# Patient Record
Sex: MALE
[De-identification: names, ages, dates, MRNs, and addresses within clinical notes are randomized per-mention and may not be internally consistent; named-entity substitution may affect disease eponyms.]

---

## 2018-06-01 PROBLEM — K42.0 UMBILICAL HERNIA WITH OBSTRUCTION, WITHOUT GANGRENE: Status: ACTIVE | Noted: 2018-06-01

## 2018-06-05 PROCEDURE — 88302 TISSUE EXAM BY PATHOLOGIST: CPT | Performed by: SURGERY

## 2018-11-09 ENCOUNTER — IMAGING SERVICES (OUTPATIENT)
Dept: OTHER | Age: 48
End: 2018-11-09

## 2018-11-09 ENCOUNTER — HOSPITAL (OUTPATIENT)
Dept: OTHER | Age: 48
End: 2018-11-09
Attending: HOSPITALIST

## 2018-11-09 LAB
ANALYZER ANC (IANC): NORMAL
ANION GAP SERPL CALC-SCNC: 16 MMOL/L (ref 10–20)
BASOPHILS # BLD: 0 THOUSAND/MCL (ref 0–0.3)
BASOPHILS NFR BLD: 0 %
BUN SERPL-MCNC: 16 MG/DL (ref 6–20)
BUN/CREAT SERPL: 14 (ref 7–25)
CALCIUM SERPL-MCNC: 9 MG/DL (ref 8.4–10.2)
CHLORIDE: 104 MMOL/L (ref 98–107)
CO2 SERPL-SCNC: 25 MMOL/L (ref 21–32)
CREAT SERPL-MCNC: 1.11 MG/DL (ref 0.67–1.17)
DIFFERENTIAL METHOD BLD: NORMAL
EOSINOPHIL # BLD: 0.2 THOUSAND/MCL (ref 0.1–0.5)
EOSINOPHIL NFR BLD: 2 %
ERYTHROCYTE [DISTWIDTH] IN BLOOD: 13.2 % (ref 11–15)
GLUCOSE SERPL-MCNC: 119 MG/DL (ref 65–99)
HEMATOCRIT: 42.8 % (ref 39–51)
HGB BLD-MCNC: 14.3 GM/DL (ref 13–17)
LYMPHOCYTES # BLD: 1.5 THOUSAND/MCL (ref 1–4.8)
LYMPHOCYTES NFR BLD: 18 %
MAGNESIUM SERPL-MCNC: 2.4 MG/DL (ref 1.7–2.4)
MCH RBC QN AUTO: 31.4 PG (ref 26–34)
MCHC RBC AUTO-ENTMCNC: 33.4 GM/DL (ref 32–36.5)
MCV RBC AUTO: 93.9 FL (ref 78–100)
MONOCYTES # BLD: 0.9 THOUSAND/MCL (ref 0.3–0.9)
MONOCYTES NFR BLD: 10 %
NEUTROPHILS # BLD: 5.8 THOUSAND/MCL (ref 1.8–7.7)
NEUTROPHILS NFR BLD: 70 %
NEUTS SEG NFR BLD: NORMAL %
NRBC (NRBCRE): NORMAL
NT-PROBNP SERPL-MCNC: 2381 PG/ML
PLATELET # BLD: 199 THOUSAND/MCL (ref 140–450)
POTASSIUM SERPL-SCNC: 4.1 MMOL/L (ref 3.4–5.1)
PROCALCITONIN SERPL IA-MCNC: <0.05 NG/ML
RBC # BLD: 4.56 MILLION/MCL (ref 4.5–5.9)
SODIUM SERPL-SCNC: 141 MMOL/L (ref 135–145)
TROPONIN I SERPL HS-MCNC: <0.02 NG/ML
WBC # BLD: 8.4 THOUSAND/MCL (ref 4.2–11)

## 2018-11-10 ENCOUNTER — DIAGNOSTIC TRANS (OUTPATIENT)
Dept: OTHER | Age: 48
End: 2018-11-10

## 2018-11-10 LAB
ANALYZER ANC (IANC): ABNORMAL
ANION GAP SERPL CALC-SCNC: 12 MMOL/L (ref 10–20)
BASOPHILS # BLD: 0 THOUSAND/MCL (ref 0–0.3)
BASOPHILS NFR BLD: 0 %
BUN SERPL-MCNC: 15 MG/DL (ref 6–20)
BUN/CREAT SERPL: 16 (ref 7–25)
CALCIUM SERPL-MCNC: 8.4 MG/DL (ref 8.4–10.2)
CHLORIDE: 104 MMOL/L (ref 98–107)
CO2 SERPL-SCNC: 28 MMOL/L (ref 21–32)
CREAT SERPL-MCNC: 0.92 MG/DL (ref 0.67–1.17)
DIFFERENTIAL METHOD BLD: ABNORMAL
EOSINOPHIL # BLD: 0.3 THOUSAND/MCL (ref 0.1–0.5)
EOSINOPHIL NFR BLD: 3 %
ERYTHROCYTE [DISTWIDTH] IN BLOOD: 13.4 % (ref 11–15)
GLUCOSE SERPL-MCNC: 105 MG/DL (ref 65–99)
HEMATOCRIT: 41.3 % (ref 39–51)
HGB BLD-MCNC: 13.5 GM/DL (ref 13–17)
LYMPHOCYTES # BLD: 2.3 THOUSAND/MCL (ref 1–4.8)
LYMPHOCYTES NFR BLD: 25 %
MCH RBC QN AUTO: 31 PG (ref 26–34)
MCHC RBC AUTO-ENTMCNC: 32.7 GM/DL (ref 32–36.5)
MCV RBC AUTO: 94.7 FL (ref 78–100)
MONOCYTES # BLD: 1 THOUSAND/MCL (ref 0.3–0.9)
MONOCYTES NFR BLD: 12 %
NEUTROPHILS # BLD: 5.3 THOUSAND/MCL (ref 1.8–7.7)
NEUTROPHILS NFR BLD: 60 %
NEUTS SEG NFR BLD: ABNORMAL %
NRBC (NRBCRE): ABNORMAL
PLATELET # BLD: 218 THOUSAND/MCL (ref 140–450)
POTASSIUM SERPL-SCNC: 3.9 MMOL/L (ref 3.4–5.1)
RBC # BLD: 4.36 MILLION/MCL (ref 4.5–5.9)
SODIUM SERPL-SCNC: 140 MMOL/L (ref 135–145)
WBC # BLD: 8.9 THOUSAND/MCL (ref 4.2–11)

## 2018-11-26 PROBLEM — C81.90 HODGKIN'S DISEASE IN REMISSION (HCC): Status: ACTIVE | Noted: 2018-11-26

## 2018-11-28 PROBLEM — I82.4Z9 DEEP VEIN THROMBOSIS (DVT) OF DISTAL VEIN OF LOWER EXTREMITY, UNSPECIFIED CHRONICITY, UNSPECIFIED LATERALITY (HCC): Status: ACTIVE | Noted: 2018-11-28

## 2018-11-28 PROBLEM — G47.33 OSA (OBSTRUCTIVE SLEEP APNEA): Status: ACTIVE | Noted: 2018-11-28

## 2018-11-28 PROBLEM — I26.99 OTHER ACUTE PULMONARY EMBOLISM WITHOUT ACUTE COR PULMONALE (HCC): Status: ACTIVE | Noted: 2018-11-28

## 2018-11-28 PROBLEM — R06.81 WITNESSED EPISODE OF APNEA: Status: ACTIVE | Noted: 2018-11-28

## 2019-03-12 PROCEDURE — 81241 F5 GENE: CPT | Performed by: INTERNAL MEDICINE

## 2019-03-12 PROCEDURE — G0452 MOLECULAR PATHOLOGY INTERPR: HCPCS | Performed by: INTERNAL MEDICINE

## 2019-03-12 PROCEDURE — 85300 ANTITHROMBIN III ACTIVITY: CPT | Performed by: INTERNAL MEDICINE

## 2019-03-12 PROCEDURE — 85610 PROTHROMBIN TIME: CPT | Performed by: INTERNAL MEDICINE

## 2019-03-12 PROCEDURE — 85303 CLOT INHIBIT PROT C ACTIVITY: CPT | Performed by: INTERNAL MEDICINE

## 2019-03-12 PROCEDURE — 85732 THROMBOPLASTIN TIME PARTIAL: CPT | Performed by: INTERNAL MEDICINE

## 2019-03-12 PROCEDURE — 81240 F2 GENE: CPT | Performed by: INTERNAL MEDICINE

## 2019-03-12 PROCEDURE — 85306 CLOT INHIBIT PROT S FREE: CPT | Performed by: INTERNAL MEDICINE

## 2019-03-12 PROCEDURE — 85613 RUSSELL VIPER VENOM DILUTED: CPT | Performed by: INTERNAL MEDICINE

## 2019-03-12 PROCEDURE — 85305 CLOT INHIBIT PROT S TOTAL: CPT | Performed by: INTERNAL MEDICINE

## 2019-03-12 PROCEDURE — 85705 THROMBOPLASTIN INHIBITION: CPT | Performed by: INTERNAL MEDICINE

## 2019-07-03 PROCEDURE — 85613 RUSSELL VIPER VENOM DILUTED: CPT | Performed by: INTERNAL MEDICINE

## 2019-07-03 PROCEDURE — 85610 PROTHROMBIN TIME: CPT | Performed by: INTERNAL MEDICINE

## 2019-07-03 PROCEDURE — 85705 THROMBOPLASTIN INHIBITION: CPT | Performed by: INTERNAL MEDICINE

## 2019-07-03 PROCEDURE — 85732 THROMBOPLASTIN TIME PARTIAL: CPT | Performed by: INTERNAL MEDICINE

## 2019-07-03 PROCEDURE — 36415 COLL VENOUS BLD VENIPUNCTURE: CPT | Performed by: INTERNAL MEDICINE

## 2019-08-26 PROBLEM — D68.62 LUPUS ANTICOAGULANT DISORDER (HCC): Status: ACTIVE | Noted: 2019-08-26

## 2020-02-27 PROBLEM — I82.4Z9 DEEP VEIN THROMBOSIS (DVT) OF DISTAL VEIN OF LOWER EXTREMITY, UNSPECIFIED CHRONICITY, UNSPECIFIED LATERALITY (HCC): Status: RESOLVED | Noted: 2018-11-28 | Resolved: 2020-02-27

## 2020-02-27 PROBLEM — K42.0 UMBILICAL HERNIA WITH OBSTRUCTION, WITHOUT GANGRENE: Status: RESOLVED | Noted: 2018-06-01 | Resolved: 2020-02-27

## 2020-02-27 PROBLEM — I26.99 OTHER ACUTE PULMONARY EMBOLISM WITHOUT ACUTE COR PULMONALE (HCC): Status: RESOLVED | Noted: 2018-11-28 | Resolved: 2020-02-27

## 2020-02-28 PROBLEM — K45.8 OTHER SPECIFIED ABDOMINAL HERNIA WITHOUT OBSTRUCTION OR GANGRENE: Status: ACTIVE | Noted: 2020-02-28

## 2020-02-28 PROBLEM — B35.6 TINEA CRURIS: Status: ACTIVE | Noted: 2020-02-28

## 2020-12-28 PROCEDURE — 88302 TISSUE EXAM BY PATHOLOGIST: CPT | Performed by: SURGERY

## 2021-04-11 DIAGNOSIS — Z23 NEED FOR VACCINATION: ICD-10-CM

## 2022-11-25 ENCOUNTER — HOSPITAL ENCOUNTER (OUTPATIENT)
Facility: HOSPITAL | Age: 52
Discharge: HOME | End: 2022-11-26
Attending: ORTHOPAEDIC SURGERY | Admitting: ORTHOPAEDIC SURGERY
Payer: COMMERCIAL

## 2022-11-25 ENCOUNTER — ANESTHESIA EVENT (OUTPATIENT)
Dept: SURGERY | Facility: HOSPITAL | Age: 52
End: 2022-11-25
Payer: COMMERCIAL

## 2022-11-25 ENCOUNTER — ANESTHESIA (OUTPATIENT)
Dept: SURGERY | Facility: HOSPITAL | Age: 52
End: 2022-11-25
Payer: COMMERCIAL

## 2022-11-25 PROBLEM — R09.02 OXYGEN DECREASE: Status: ACTIVE | Noted: 2022-11-25

## 2022-11-25 LAB
APTT PPP: 27.1 SECONDS (ref 23.3–35.6)
ERYTHROCYTE [DISTWIDTH] IN BLOOD BY AUTOMATED COUNT: 13 %
HCT VFR BLD AUTO: 42.8 %
HGB BLD-MCNC: 14.4 G/DL
INR BLD: 0.99 (ref 0.85–1.16)
MCH RBC QN AUTO: 32.4 PG (ref 26–34)
MCHC RBC AUTO-ENTMCNC: 33.6 G/DL (ref 31–37)
MCV RBC AUTO: 96.4 FL
PLATELET # BLD AUTO: 277 10(3)UL (ref 150–450)
PROTHROMBIN TIME: 13.1 SECONDS (ref 11.6–14.8)
RBC # BLD AUTO: 4.44 X10(6)UL
SARS-COV-2 RNA RESP QL NAA+PROBE: NOT DETECTED
WBC # BLD AUTO: 8.3 X10(3) UL (ref 4–11)

## 2022-11-25 PROCEDURE — 85027 COMPLETE CBC AUTOMATED: CPT

## 2022-11-25 PROCEDURE — 85730 THROMBOPLASTIN TIME PARTIAL: CPT

## 2022-11-25 PROCEDURE — 76942 ECHO GUIDE FOR BIOPSY: CPT | Performed by: ANESTHESIOLOGY

## 2022-11-25 PROCEDURE — 0LQR0ZZ REPAIR LEFT KNEE TENDON, OPEN APPROACH: ICD-10-PCS | Performed by: ORTHOPAEDIC SURGERY

## 2022-11-25 PROCEDURE — 85610 PROTHROMBIN TIME: CPT

## 2022-11-25 RX ORDER — HYDROMORPHONE HYDROCHLORIDE 1 MG/ML
0.2 INJECTION, SOLUTION INTRAMUSCULAR; INTRAVENOUS; SUBCUTANEOUS EVERY 5 MIN PRN
Status: ACTIVE | OUTPATIENT
Start: 2022-11-25 | End: 2022-11-25

## 2022-11-25 RX ORDER — HYDROMORPHONE HYDROCHLORIDE 1 MG/ML
INJECTION, SOLUTION INTRAMUSCULAR; INTRAVENOUS; SUBCUTANEOUS
Status: COMPLETED
Start: 2022-11-25 | End: 2022-11-25

## 2022-11-25 RX ORDER — ONDANSETRON 2 MG/ML
4 INJECTION INTRAMUSCULAR; INTRAVENOUS EVERY 6 HOURS PRN
Status: DISCONTINUED | OUTPATIENT
Start: 2022-11-25 | End: 2022-11-25 | Stop reason: HOSPADM

## 2022-11-25 RX ORDER — HYDROCODONE BITARTRATE AND ACETAMINOPHEN 5; 325 MG/1; MG/1
1 TABLET ORAL EVERY 6 HOURS PRN
Qty: 16 TABLET | Refills: 0 | Status: SHIPPED | OUTPATIENT
Start: 2022-11-25

## 2022-11-25 RX ORDER — HYDROMORPHONE HYDROCHLORIDE 1 MG/ML
0.6 INJECTION, SOLUTION INTRAMUSCULAR; INTRAVENOUS; SUBCUTANEOUS EVERY 5 MIN PRN
Status: ACTIVE | OUTPATIENT
Start: 2022-11-25 | End: 2022-11-25

## 2022-11-25 RX ORDER — DEXAMETHASONE SODIUM PHOSPHATE 4 MG/ML
VIAL (ML) INJECTION AS NEEDED
Status: DISCONTINUED | OUTPATIENT
Start: 2022-11-25 | End: 2022-11-25 | Stop reason: SURG

## 2022-11-25 RX ORDER — BUPIVACAINE HYDROCHLORIDE AND EPINEPHRINE 5; 5 MG/ML; UG/ML
INJECTION, SOLUTION EPIDURAL; INTRACAUDAL; PERINEURAL AS NEEDED
Status: DISCONTINUED | OUTPATIENT
Start: 2022-11-25 | End: 2022-11-25 | Stop reason: HOSPADM

## 2022-11-25 RX ORDER — PROCHLORPERAZINE EDISYLATE 5 MG/ML
5 INJECTION INTRAMUSCULAR; INTRAVENOUS EVERY 8 HOURS PRN
Status: DISCONTINUED | OUTPATIENT
Start: 2022-11-25 | End: 2022-11-25 | Stop reason: HOSPADM

## 2022-11-25 RX ORDER — LIDOCAINE HYDROCHLORIDE 10 MG/ML
INJECTION, SOLUTION EPIDURAL; INFILTRATION; INTRACAUDAL; PERINEURAL AS NEEDED
Status: DISCONTINUED | OUTPATIENT
Start: 2022-11-25 | End: 2022-11-25 | Stop reason: SURG

## 2022-11-25 RX ORDER — NALOXONE HYDROCHLORIDE 0.4 MG/ML
80 INJECTION, SOLUTION INTRAMUSCULAR; INTRAVENOUS; SUBCUTANEOUS AS NEEDED
Status: ACTIVE | OUTPATIENT
Start: 2022-11-25 | End: 2022-11-25

## 2022-11-25 RX ORDER — ONDANSETRON 2 MG/ML
INJECTION INTRAMUSCULAR; INTRAVENOUS AS NEEDED
Status: DISCONTINUED | OUTPATIENT
Start: 2022-11-25 | End: 2022-11-25 | Stop reason: SURG

## 2022-11-25 RX ORDER — ONDANSETRON 2 MG/ML
INJECTION INTRAMUSCULAR; INTRAVENOUS
Status: COMPLETED
Start: 2022-11-25 | End: 2022-11-25

## 2022-11-25 RX ORDER — HYDROCODONE BITARTRATE AND ACETAMINOPHEN 5; 325 MG/1; MG/1
2 TABLET ORAL EVERY 4 HOURS PRN
Status: DISCONTINUED | OUTPATIENT
Start: 2022-11-25 | End: 2022-11-26

## 2022-11-25 RX ORDER — ACETAMINOPHEN 500 MG
1000 TABLET ORAL ONCE
Status: DISCONTINUED | OUTPATIENT
Start: 2022-11-25 | End: 2022-11-25 | Stop reason: HOSPADM

## 2022-11-25 RX ORDER — SCOLOPAMINE TRANSDERMAL SYSTEM 1 MG/1
1 PATCH, EXTENDED RELEASE TRANSDERMAL
Status: DISCONTINUED | OUTPATIENT
Start: 2022-11-25 | End: 2022-11-25 | Stop reason: HOSPADM

## 2022-11-25 RX ORDER — HYDROMORPHONE HYDROCHLORIDE 1 MG/ML
0.4 INJECTION, SOLUTION INTRAMUSCULAR; INTRAVENOUS; SUBCUTANEOUS EVERY 5 MIN PRN
Status: ACTIVE | OUTPATIENT
Start: 2022-11-25 | End: 2022-11-25

## 2022-11-25 RX ORDER — PROCHLORPERAZINE EDISYLATE 5 MG/ML
INJECTION INTRAMUSCULAR; INTRAVENOUS
Status: COMPLETED
Start: 2022-11-25 | End: 2022-11-25

## 2022-11-25 RX ORDER — SODIUM CHLORIDE, SODIUM LACTATE, POTASSIUM CHLORIDE, CALCIUM CHLORIDE 600; 310; 30; 20 MG/100ML; MG/100ML; MG/100ML; MG/100ML
INJECTION, SOLUTION INTRAVENOUS CONTINUOUS
Status: DISCONTINUED | OUTPATIENT
Start: 2022-11-25 | End: 2022-11-26

## 2022-11-25 RX ORDER — DEXAMETHASONE SODIUM PHOSPHATE 10 MG/ML
INJECTION, SOLUTION INTRAMUSCULAR; INTRAVENOUS AS NEEDED
Status: DISCONTINUED | OUTPATIENT
Start: 2022-11-25 | End: 2022-11-25 | Stop reason: SURG

## 2022-11-25 RX ORDER — ONDANSETRON 4 MG/1
4 TABLET, FILM COATED ORAL EVERY 8 HOURS PRN
Qty: 10 TABLET | Refills: 0 | Status: SHIPPED | OUTPATIENT
Start: 2022-11-25

## 2022-11-25 RX ORDER — SCOLOPAMINE TRANSDERMAL SYSTEM 1 MG/1
1 PATCH, EXTENDED RELEASE TRANSDERMAL ONCE
Status: DISCONTINUED | OUTPATIENT
Start: 2022-11-25 | End: 2022-11-25 | Stop reason: HOSPADM

## 2022-11-25 RX ORDER — CEFAZOLIN SODIUM/WATER 2 G/20 ML
2 SYRINGE (ML) INTRAVENOUS ONCE
Status: COMPLETED | OUTPATIENT
Start: 2022-11-25 | End: 2022-11-25

## 2022-11-25 RX ORDER — ACETAMINOPHEN 325 MG/1
650 TABLET ORAL EVERY 6 HOURS PRN
Status: DISCONTINUED | OUTPATIENT
Start: 2022-11-25 | End: 2022-11-26

## 2022-11-25 RX ORDER — HYDROCODONE BITARTRATE AND ACETAMINOPHEN 5; 325 MG/1; MG/1
1 TABLET ORAL EVERY 4 HOURS PRN
Status: DISCONTINUED | OUTPATIENT
Start: 2022-11-25 | End: 2022-11-26

## 2022-11-25 RX ORDER — KETOROLAC TROMETHAMINE 30 MG/ML
INJECTION, SOLUTION INTRAMUSCULAR; INTRAVENOUS AS NEEDED
Status: DISCONTINUED | OUTPATIENT
Start: 2022-11-25 | End: 2022-11-25 | Stop reason: SURG

## 2022-11-25 RX ORDER — CEFAZOLIN SODIUM/WATER 2 G/20 ML
SYRINGE (ML) INTRAVENOUS
Status: DISPENSED
Start: 2022-11-25 | End: 2022-11-25

## 2022-11-25 RX ORDER — SCOLOPAMINE TRANSDERMAL SYSTEM 1 MG/1
PATCH, EXTENDED RELEASE TRANSDERMAL
Status: DISCONTINUED
Start: 2022-11-25 | End: 2022-11-26

## 2022-11-25 RX ADMIN — ONDANSETRON 4 MG: 2 INJECTION INTRAMUSCULAR; INTRAVENOUS at 07:22:00

## 2022-11-25 RX ADMIN — DEXAMETHASONE SODIUM PHOSPHATE 8 MG: 4 MG/ML VIAL (ML) INJECTION at 07:22:00

## 2022-11-25 RX ADMIN — SODIUM CHLORIDE, SODIUM LACTATE, POTASSIUM CHLORIDE, CALCIUM CHLORIDE: 600; 310; 30; 20 INJECTION, SOLUTION INTRAVENOUS at 07:07:00

## 2022-11-25 RX ADMIN — KETOROLAC TROMETHAMINE 30 MG: 30 INJECTION, SOLUTION INTRAMUSCULAR; INTRAVENOUS at 07:22:00

## 2022-11-25 RX ADMIN — LIDOCAINE HYDROCHLORIDE 100 MG: 10 INJECTION, SOLUTION EPIDURAL; INFILTRATION; INTRACAUDAL; PERINEURAL at 07:12:00

## 2022-11-25 RX ADMIN — CEFAZOLIN SODIUM/WATER 2 G: 2 G/20 ML SYRINGE (ML) INTRAVENOUS at 07:16:00

## 2022-11-25 RX ADMIN — DEXAMETHASONE SODIUM PHOSPHATE 2 MG: 10 INJECTION, SOLUTION INTRAMUSCULAR; INTRAVENOUS at 07:20:00

## 2022-11-25 RX ADMIN — SODIUM CHLORIDE, SODIUM LACTATE, POTASSIUM CHLORIDE, CALCIUM CHLORIDE: 600; 310; 30; 20 INJECTION, SOLUTION INTRAVENOUS at 08:59:00

## 2022-11-25 NOTE — BRIEF OP NOTE
Pre-Operative Diagnosis: QUADRICEPS TENDON RUPTURE     Post-Operative Diagnosis: QUADRICEPS TENDON RUPTURE      Procedure Performed:   QUADRICEPS TENDON REPAIR LEFT KNEE    Surgeon(s) and Role:     Carolyn Shabazz MD - Primary    Assistant(s):  PA:  Wendy Pryor PA-C     Surgical Findings: See detailed operative report     Specimen: None     Estimated Blood Loss: 20 mL    Rica Younger MD  11/25/2022  8:57 AM

## 2022-11-25 NOTE — DISCHARGE INSTRUCTIONS
Sophie Kenney MD  [facility] [address] [place] [address]  Orthopaedic Surgery - Sports Medicine      Post Operative Instructions: Knee Quadriceps Tendon Repair    WEIGHT BEARING / MOVEMENT  It is safe to put weight on your surgical limb if you are in the post-operative knee immobilizer in extension. Keep the brace locked in extension while ambulating for the first 6 weeks postoperatively. Do not go for long walks or stand on your feet for extended periods of time, as these activities will cause swelling and pain in your knee. Always wear the brace when not icing or bathing. The focus for knee range of motion the first 2 weeks after surgery is achieving full extension. When you are resting at home, prop the leg up by supporting the lower leg and ankle allowing the back of the knee to hang unsupported. Do not bend your knee until you have had your first post-op visit (7-10 days post-op). Physical therapy will begin approximately 3-4 weeks post-op following your first post-op visit. ICE  You should use ice packs over the surgical site regularly throughout the day to help decrease swelling and pain. Make sure to have a layer between the ice and your knee so as to not injury your skin. In addition to icing your knee, elevate your knee so that your toes are above your nose, as this will help significantly to reduce swelling. MEDICATIONS  Nearly all patients will receive a nerve block for pain control immediately following surgery. It will wear off over 18-24 hours. During this time, you will have little to no feeling in the body part where you had surgery (i.e. the leg). To control your pain during the transition while the nerve block is wearing off, you should start the use of your pain medication, Norco, as you feel is needed. Restart your home anticoagulation medication the day following surgery and continue as previously prescribed.     An anti-nausea medication, Zofran, was prescribed for you and should be taken on an as needed basis, as the pain medication can cause nausea. To minimize this side effect, you should take your pain medication with some food. DRESSING / BANDAGES:  Keep your surgical dressing clean and dry. Do not remove your dressing until your first post-op visit. You should take a shower with a plastic bag over your leg to knee the dressing clean and dry. Do not take a bath or submerge your knee in water until your incisions are checked by me at your post-operative appointment and fully healed with all stitches removed. This is typically 2-3 weeks after surgery. TEMPERATURE  It is normal to have an elevated temperature during the first 2-3 days post-operatively. Please call our office if your temperature is above 101F, if there is increased redness around the incision sites, or if there is increased drainage from the incision sites. APPOINTMENT  It is likely that my surgical scheduler, Lisa Campos, has already scheduled a post-operative visit for you. This should occur 2 weeks after surgery. At that visit you will be given a prescription for physical therapy.

## 2022-11-25 NOTE — H&P
ORTHOPEDIC SURGERY CLINIC H+P     Patient Wendy Capone  Date of Appointment: 11/25/2022    Chief Complaint: Patient presents with:  Left Knee - Pain: L knee. On 11/19/22 he slipped on the ice and felt his knee buckle underneath him. He noticed immediate pain, swelling and shifting. He went to the ER right after the incident out of town. He had x-rays taken, was advised they were normal. He was given crutches, immobilizer and a Norco prescription. He hasn't filled medication, taking Tylenol instead. He states symptoms are about the same. He reports anterior knee pain into quad and lower leg. Previous ACL tear in 1989 with repair    HPI:   The patient is a 47year old male who presents for evaluation of a left knee injury suffered in a mechanical fall on 11/19/2022. Patient states that he slipped on ice and felt his left knee buckle underneath him. He had immediate onset pain and swelling and a feeling of shifting of his kneecap. He went to the emergency room as he was out of town on travel and had x-rays taken that were negative for fracture. He was placed into a knee immobilizer and provided crutches as well as Norco pain medication. He denies any need for ongoing pain medication has been taking Tylenol on an as-needed basis. He continues to have discomfort in the anterior aspect of the knee and notes an inability to extend the leg. Of note he has a extensive prior surgical history to bilateral knees with an ACL reconstruction in 1989 to the left knee. Additionally he is on Xarelto for positive lupus anticoagulant with a prior history of pulmonary embolism in 2018.     Past Medical History  Past Medical History:   Diagnosis Date   Hodgkin's disease ([address] 2002   Lupus anticoagulant positive   HONORIO (obstructive sleep apnea) 4/9/19 HST   AHI 48 Supine AHI 57 non-supine AHI 41 Sao2 Ag 80%   Pulmonary embolism ([address] 11/2018     Past Surgical History  Past Surgical History:   Procedure Laterality Date   OTHER SURGICAL HISTORY   bilat ACL   OTHER SURGICAL HISTORY   patella right   OTHER SURGICAL HISTORY   port-a-cath placement and removal     Medications    Current Outpatient Medications:   rivaroxaban (XARELTO) 20 MG Oral Tab, Take 1 tablet (20 mg total) by mouth in the morning., Disp: 90 tablet, Rfl: 3  rivaroxaban (XARELTO) 20 MG Oral Tab, Take 1 tablet (20 mg total) by mouth daily with food. , Disp: 30 tablet, Rfl: 0    Allergies    Shellfish NAUSEA AND VOMITING  Green Beans     Social History  Social History  Tobacco Use  Smoking status: Never  Smokeless tobacco: Never  Alcohol use: Yes  Drug use: Not on file      Family History:  Family History   Problem Relation Age of Onset   Heart Disorder Father   Cancer Father   Colon   Other (copd) Father   Cancer Maternal Grandfather   ? Lung? Clotting Disorder Maternal Grandfather   Blood clots     Review of Systems:   Constitutional: Denies fever, chills or weight loss   Allergies: As described in allergies  HEENT: Denies sore throat or acute eye problems   Respiratory: Denies shortness of breath   Cardiovascular: Denies chest pain or palpitations  GI: Denies abdominal pain, nausea  Skin: Denies rash   Neurologic: Denies headache or other problems  Musculoskeletal: As described in HPI  14 System Review of Systems otherwise negative     Physical Exam:     There were no vitals taken for this visit. Constitutional: No acute distress. Well-nourished. Well-developed. Head/Face: Normal appearance. Normocephalic. Atraumatic. Respiratory: Normal Effort  Cardiovascular: warm, well-perfused distal extremities  Neurological: Oriented to time, place, and situation. Psych: Normal mood, appropriate affect. Musculoskeletal:    Gait: Normal. The patient can bear weight on the injured extremity. Neurovascular exam: Sensation is intact to light touch in all dermatomes of the lower extremities and distal pulses are normal. Capillary refill is < 2 seconds.   Lumbar Spine: No tenderness to palpation and supine straight leg raise is negative. KNEE:  11/22/2022 11/22/2022    RIGHT Knee LEFT Knee   Deformity No gross deformity Large area of swelling present to anterolateral distal thigh just proximal to the patella   Skin Clean, dry, and intact. No erythema or ecchymosis Swelling as able with some ecchymosis in the area. Atrophy None None   Tenderness None Superior Pole of Patella/Quad Tendon Insertion   Effusion Negative 3+   Range of Motion  Extension: 0  Flexion: 140 Extension: 0, passive, unable to actively extend the leg  Flexion: 50, limited by pain   Strength Normal and equal bilaterally Normal and equal bilaterally   Stability Lachman: Stable  LCL: Stable  MCL: Stable  PCL: Stable Lachman: Stable  LCL: Stable  MCL: Stable  PCL: Stable   Special Tests None None   Hip motion No pain on internal and external rotation No pain on internal and external rotation   Sensation Intact except as noted  Intact except as noted   Perfusion Distal pulse present Distal pulse present   Lymphatics No lymphadenopathy and No edema  No lymphadenopathy and No edema       Diagnostic Studies:     3 views of the left knee including bilateral AP standing, sunrise, and affected lateral demonstrate no acute fracture or dislocation. There are post-surgical changes to bilateral knees. There is well-preserved joint space in lateral and patellofemoral compartments with evidence of moderate degenerative change to the medial compartment of the left knee. There is appropriate patellar positioning on lateral imaging without evidence of olvin or baja. MR of the left knee from today demonstrates full thickness rupture of the quadriceps tendon insertion on the superior pole of the patella. There is chronic appearing retear of prior ACL reconstruction. Chronic appearing tear of the posterior horn of the medial meniscus. Assessment:     47year old male presents with the following diagnoses:    1.  Left knee pain, unspecified chronicity  - XR KNEE, COMPLETE (4 OR MORE VIEWS), LEFT (CWE=12887); Future  - MRI KNEE, LEFT (CPT=73721);  Future    Plan:     Plan:  - To OR for L quadriceps tendon repair    Kwesi Graham MD  [address] Surgery, Sports Medicine

## 2022-11-25 NOTE — ANESTHESIA PROCEDURE NOTES
Regional Block    Date/Time: 11/25/2022 7:18 AM  Performed by: Jada mAin MD  Authorized by: Jada Amin MD       General Information and Staff    Start Time:  11/25/2022 7:18 AM  End Time:  11/25/2022 7:20 AM  Anesthesiologist:  Jada Amin MD  Performed by: Anesthesiologist  Patient Location:  OR    Block Placement: Post Induction  Site Identification: real time ultrasound guided    Site Identification comment:  Image printed and filed in patient's chart  Block site/laterality marked before start: site marked  Reason for Block: at surgeon's request and post-op pain management    Preanesthetic Checklist: 2 patient identifers, IV checked, site marked, risks and benefits discussed, monitors and equipment checked, pre-op evaluation, timeout performed, anesthesia consent, sterile technique used, no prohibitive neurological deficits and no local skin infection at insertion site      Procedure Details    Patient Position:  Supine  Prep: ChloraPrep    Monitoring:  Heart rate, cardiac monitor, continuous pulse ox and blood pressure cuff  Block Type: Adductor canal  Laterality:  Left  Injection Technique:  Single-shot    Needle    Needle Type:  Echogenic (Pajunk Sono Plex II)  Needle Gauge:  21 G  Needle Length:  100 mm  Needle Localization:  Ultrasound guidance  Reason for Ultrasound Use: appropriate spread of the medication was noted in real time and no ultrasound evidence of intravascular and/or intraneural injection            Assessment    Injection Assessment:  Good spread noted, incremental injection, low pressure, negative aspiration for heme and negative resistance  Block paresthesia pain: Block performed after primary anesthetic   Heart Rate Change: No    - Patient tolerated block procedure well without evidence of immediate block related complications.      Medications  11/25/2022 7:18 AM      Additional Comments    20ml Bupivacaine 0.375% + 2mg Preservative-free Dexamethasone

## 2022-11-26 VITALS
BODY MASS INDEX: 40.81 KG/M2 | RESPIRATION RATE: 18 BRPM | SYSTOLIC BLOOD PRESSURE: 109 MMHG | HEART RATE: 92 BPM | WEIGHT: 260 LBS | DIASTOLIC BLOOD PRESSURE: 61 MMHG | OXYGEN SATURATION: 95 % | HEIGHT: 67 IN | TEMPERATURE: 98 F

## 2022-11-26 PROCEDURE — 97161 PT EVAL LOW COMPLEX 20 MIN: CPT

## 2022-11-26 PROCEDURE — 97530 THERAPEUTIC ACTIVITIES: CPT

## 2022-11-26 PROCEDURE — 97535 SELF CARE MNGMENT TRAINING: CPT

## 2022-11-26 PROCEDURE — 97116 GAIT TRAINING THERAPY: CPT

## 2022-11-26 PROCEDURE — 97165 OT EVAL LOW COMPLEX 30 MIN: CPT

## 2022-11-26 NOTE — PLAN OF CARE
Alert and oriented x4. VSS. On RA. . IS encouraged. Telemetry monitoring. Tolerating diet. Pain controlled with PRN medication. DTV by midnight. Dressing to left knee C/D/I. [address] in place. Plan is dc when medically cleared. Call light within reach.

## 2022-11-26 NOTE — PLAN OF CARE
Pt A&O. On room air. Wears cpap at night at home. Tele and  monitoring maintained. Tolerating diet with good appetite. Last BM 11/24/22. Voiding w/o difficulty. Pain managed with oral medications. Pt reminded to \"call; don't fall\". Participating in therapy as ordered. Up independently. Knee immobilizer locked in extension when up. Pt has crutches to use prn. WBAT to LLE. Ace wrap drsg C/D/I. Ice packs on as needed for pain/swelling. LLE elevated on pillows at the ankle to promote stretching the leg in extension. Pt ready for dc home today. Rajendra Salmon for him to resume his xarelto this evening as per Dr James Jade. Pt's spouse, Fede Glass, to pick pt up today after she gets off work.

## 2022-11-26 NOTE — PROGRESS NOTES
NURSING ADMISSION NOTE      Patient admitted via Cart  Oriented to room. Safety precautions initiated. Bed in low position. Call light in reach. Pt A&Ox4, VSS on RA, , IS. Pt denies any pain at this time. Dressing to L knee C/D/I. Immobilizer in place. Pt to have brace in place, in locked position and in extension when OOB. Tolerating regular diet. DTV by midnight. Plan of care reviewed with patient.  Patient demonstrates understanding

## (undated) DEVICE — DRAPE,U/SHT,SPLIT,FILM,60X84,STERILE: Brand: MEDLINE

## (undated) DEVICE — LIGHT HANDLE

## (undated) DEVICE — SUT VICRYL 0 CP-1 J267H

## (undated) DEVICE — LOWER EXTREMITY CDS-LF: Brand: MEDLINE INDUSTRIES, INC.

## (undated) DEVICE — DRESS WOUND AQUACEL 3.5INX10IN

## (undated) DEVICE — SLEEVE KENDALL SCD EXPRESS MED

## (undated) DEVICE — SUT ETHIBOND 2 OS-4 X519H

## (undated) DEVICE — SUT FIBERWIRE 2 C-13 AR-7202

## (undated) DEVICE — INTENDED TO AID IN THE PASSING OF SUTURES THROUGH BONE AND SOFT TISSUE DURING ORTHOPEDIC SURGERY: Brand: HOFFEE SUTURE RETRIEVER

## (undated) DEVICE — SUT MONOCRYL 2-0 SH Y417H

## (undated) DEVICE — SOLUTION  .9 1000ML BTL

## (undated) DEVICE — STERILE POLYISOPRENE POWDER-FREE SURGICAL GLOVES: Brand: PROTEXIS

## (undated) DEVICE — CLOSURE EXOFIN 1.0ML

## (undated) DEVICE — PROXIMATE SKIN STAPLERS (35 WIDE) CONTAINS 35 STAINLESS STEEL STAPLES (FIXED HEAD): Brand: PROXIMATE

## (undated) DEVICE — APPLICATOR CHLORAPREP 26ML

## (undated) DEVICE — SUT VICRYL 0 J912G

## (undated) DEVICE — BNDG COMPR W6INXL3YD DISP

## (undated) DEVICE — BNDG COHESIVE W4INXL5YD TAN E

## (undated) DEVICE — SUT FIBERWIRE 5 CCS-1 AR-7211

## (undated) DEVICE — NEEDLE ANCHOR 1824-5D